# Patient Record
Sex: MALE | Race: BLACK OR AFRICAN AMERICAN
[De-identification: names, ages, dates, MRNs, and addresses within clinical notes are randomized per-mention and may not be internally consistent; named-entity substitution may affect disease eponyms.]

---

## 2017-03-23 NOTE — REP
LUMBOSACRAL SPINE:

 

Five views of the lumbosacral spine are performed. There is no compression

fracture.  There is normal lumbar lordosis. There is no spondylolysis or

spondylolisthesis. The disc spaces are well preserved.  There is mild curvature

toward the right.

 

IMPRESSION:

 

Mild curvature toward the right without fracture or dislocation.

 

 

Signed by

Vito Morales MD 03/23/2017 12:30 P

## 2017-09-27 ENCOUNTER — HOSPITAL ENCOUNTER (OUTPATIENT)
Dept: HOSPITAL 53 - M PT | Age: 32
LOS: 3 days | End: 2017-09-30
Attending: GENERAL PRACTICE
Payer: OTHER GOVERNMENT

## 2017-09-27 DIAGNOSIS — Z51.89: Primary | ICD-10-CM

## 2017-09-27 DIAGNOSIS — M25.511: ICD-10-CM

## 2017-11-30 ENCOUNTER — HOSPITAL ENCOUNTER (OUTPATIENT)
Dept: HOSPITAL 53 - M PT | Age: 32
End: 2017-11-30
Attending: GENERAL PRACTICE
Payer: OTHER GOVERNMENT

## 2017-11-30 DIAGNOSIS — M25.511: ICD-10-CM

## 2017-11-30 DIAGNOSIS — Z51.89: Primary | ICD-10-CM

## 2017-12-04 ENCOUNTER — HOSPITAL ENCOUNTER (OUTPATIENT)
Dept: HOSPITAL 53 - M PT | Age: 32
LOS: 27 days | End: 2017-12-31
Attending: GENERAL PRACTICE
Payer: OTHER GOVERNMENT

## 2017-12-04 DIAGNOSIS — M25.511: ICD-10-CM

## 2017-12-04 DIAGNOSIS — Z51.89: Primary | ICD-10-CM

## 2017-12-04 PROCEDURE — 97110 THERAPEUTIC EXERCISES: CPT

## 2018-01-30 ENCOUNTER — HOSPITAL ENCOUNTER (OUTPATIENT)
Dept: HOSPITAL 53 - M RADPRO | Age: 33
End: 2018-01-30
Attending: PHYSICIAN ASSISTANT
Payer: COMMERCIAL
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
## 2018-01-30 DIAGNOSIS — M25.511: ICD-10-CM

## 2018-01-30 DIAGNOSIS — M75.80: Primary | ICD-10-CM
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
## 2018-01-30 PROCEDURE — 23350 INJECTION FOR SHOULDER X-RAY: CPT

## 2018-05-01 ENCOUNTER — HOSPITAL ENCOUNTER (EMERGENCY)
Dept: HOSPITAL 53 - M ED | Age: 33
LOS: 1 days | Discharge: HOME | End: 2018-05-02
Payer: COMMERCIAL

## 2018-05-01 DIAGNOSIS — H66.92: Primary | ICD-10-CM

## 2018-05-01 DIAGNOSIS — Z79.899: ICD-10-CM

## 2018-05-01 PROCEDURE — 99283 EMERGENCY DEPT VISIT LOW MDM: CPT

## 2018-05-02 RX ADMIN — AMOXICILLIN 1 MG: 500 CAPSULE ORAL at 04:39

## 2018-05-02 RX ADMIN — BENZONATATE 1 MG: 100 CAPSULE ORAL at 04:40
